# Patient Record
Sex: FEMALE | Race: BLACK OR AFRICAN AMERICAN | NOT HISPANIC OR LATINO | Employment: FULL TIME | URBAN - METROPOLITAN AREA
[De-identification: names, ages, dates, MRNs, and addresses within clinical notes are randomized per-mention and may not be internally consistent; named-entity substitution may affect disease eponyms.]

---

## 2025-04-14 ENCOUNTER — OFFICE VISIT (OUTPATIENT)
Dept: ORTHOPEDIC SURGERY | Facility: HOSPITAL | Age: 45
End: 2025-04-14
Payer: COMMERCIAL

## 2025-04-14 VITALS — HEIGHT: 66 IN | WEIGHT: 200 LBS | BODY MASS INDEX: 32.14 KG/M2

## 2025-04-14 DIAGNOSIS — S93.402A MODERATE ANKLE SPRAIN, LEFT, INITIAL ENCOUNTER: Primary | ICD-10-CM

## 2025-04-14 DIAGNOSIS — S92.152A AVULSION FRACTURE OF LEFT TALUS, CLOSED, INITIAL ENCOUNTER: ICD-10-CM

## 2025-04-14 PROCEDURE — 99214 OFFICE O/P EST MOD 30 MIN: CPT | Performed by: PHYSICIAN ASSISTANT

## 2025-04-14 PROCEDURE — 1036F TOBACCO NON-USER: CPT | Performed by: PHYSICIAN ASSISTANT

## 2025-04-14 PROCEDURE — L4361 PNEUMA/VAC WALK BOOT PRE OTS: HCPCS | Performed by: PHYSICIAN ASSISTANT

## 2025-04-14 PROCEDURE — 99204 OFFICE O/P NEW MOD 45 MIN: CPT | Performed by: PHYSICIAN ASSISTANT

## 2025-04-14 PROCEDURE — 3008F BODY MASS INDEX DOCD: CPT | Performed by: PHYSICIAN ASSISTANT

## 2025-04-14 ASSESSMENT — PAIN - FUNCTIONAL ASSESSMENT: PAIN_FUNCTIONAL_ASSESSMENT: 0-10

## 2025-04-14 ASSESSMENT — PAIN SCALES - GENERAL: PAINLEVEL_OUTOF10: 4

## 2025-04-14 NOTE — PROGRESS NOTES
History of Present Illness  45 y.o.female presents at same day walk in clinic for left ankle pain  1. Moderate ankle sprain, left, initial encounter  Walking Boot Tall    Referral to Physical Therapy      2. Avulsion fracture of left talus, closed, initial encounter  Walking Boot Tall    Referral to Physical Therapy        Mechanism of injury: ankle inversion on curb  Date of Injury/Pain: 4/12/25  Location of pain: lateral ankle  Frequency of Pain: worse with walking or standing  Associated symptoms? Swelling.  Previous treatment?  UC, xray, splint, NWB with crutches    Past Medical History:   Diagnosis Date    Encounter for immunization 12/14/2017    Need for influenza vaccination    Encounter for immunization 07/23/2013    Need for Td vaccine    Encounter for immunization 08/02/2013    Need for chickenpox vaccination    Other conditions influencing health status     Termination of pregnancy    Other conditions influencing health status     History of vaginal delivery    Personal history of other infectious and parasitic diseases     History of sexually transmitted disease    Personal history of other infectious and parasitic diseases 09/09/2014    History of trichomoniasis        No Known Allergies     No past surgical history on file.     No family history on file.     Social History     Socioeconomic History    Marital status: Single     Spouse name: Not on file    Number of children: Not on file    Years of education: Not on file    Highest education level: Not on file   Occupational History    Not on file   Tobacco Use    Smoking status: Never    Smokeless tobacco: Never   Vaping Use    Vaping status: Every Day    Substances: Nicotine, Flavoring    Devices: Disposable   Substance and Sexual Activity    Alcohol use: Not Currently    Drug use: Never    Sexual activity: Not on file   Other Topics Concern    Not on file   Social History Narrative    Not on file     Social Drivers of Health     Financial Resource  Strain: Not on File (2022)    Received from "[employer]    Financial Resource Strain     Financial Resource Strain: 0   Food Insecurity: Not on File (2024)    Received from "[employer]    Food Insecurity     Food: 0   Transportation Needs: Not on File (2022)    Received from "[employer]    Transportation Needs     Transportation: 0   Physical Activity: Not on File (2022)    Received from "[employer]    Physical Activity     Physical Activity: 0   Stress: Not on File (2022)    Received from "[employer]    Stress     Stress: 0   Social Connections: Not on File (2024)    Received from "[employer]    Social Connections     Connectedness: 0   Intimate Partner Violence: Not on file   Housing Stability: Not on File (2022)    Received from "[employer]    Housing Stability     Housin        CURRENT MEDICATIONS:   No current outpatient medications on file.     No current facility-administered medications for this visit.        REVIEW OF SYSTEMS  27 point review of systems negative except what is stated in HPI    Constitutional Exam: patient's height and weight reviewed, well-kempt  Psychiatric Exam: alert and oriented x 3, appropriate mood and behavior  Eye Exam: JUDY, EOMI  Pulmonary Exam: breathing non-labored, no apparent distress  Lymphatic exam: no appreciable lymphadenopathy in the lower extremities  Cardiovascular exam: DP pulses 2+ bilaterally, PT 2+ bilaterally, toes are pink with good capillary refill, no pitting edema  Skin exam: no open lesions, rashes, abrasions or ulcerations  Neurological exam: sensation to light touch intact in both lower extremities in peripheral and dermatomal distributions (except for any abnormalities noted in musculoskeletal exam)    PHYSICAL EXAM  On examination of the left ankle/foot:  NWB  Normal alignment  Minimal swelling of the ankle. No ecchymosis or erythema  Normal ROM in plantarflexion, dorsiflexion, inversion and eversion  Normal strength in plantarflexion, dorsiflexion, inversion  and eversion.  Tenderness to palpation: ATFL and lateral malleolus  No tenderness to palpation over the Achilles, medial malleolus, posterior tibial tendon, peroneal tendon, base of fifth metatarsal, deltoid ligament, talus or navicular.  Neurovascularly intact.  No sensory deficit to light touch.  Dorsalis pedis and posterior tibial pulses 2+ bilaterally.  Negative proprioception testing.   - Sebastian's test                              - Dorsiflexion-eversion test  1+ Anterior Drawer test                        - Talar tilt test  - Squeeze test     IMAGING  I personally reviewed the patient's x-ray images and reports of the left ankle. The xrays show no fractures or dislocation.  Normal joint spacing. Small ossicles adjacent to the dorsal talus        ASSESSMENT: left ankle sprain grade II    PLAN: I discussed with the patient the differential diagnosis, complex overuse and degenerative related nature of the condition(s) and available treatment option(s). Patient was placed in a tall CAM walker boot to be WBAT with crutches.  They were given boot instructions. The patient was given a prescription for physical therapy.  Physical therapy is medically necessary to improve strength, balance, range of motion and functional outcomes after injury and/or surgery. Patient was given a handout and instructed on an at home stretching program.  They should do these exercises 3 times per day for 6 weeks and then daily. Patient can use OTC aspercream for pain and continue to ice and elevate supported at the calf to reduce swelling. All the patient's questions were answered. The patient agrees with the above plan.  Follow up in 4-6 weeks or as needed    Patient was prescribed a CAM walker boot for ankle sprain.The patient is ambulatory with or without aid; but, has weakness, instability and/or deformity of their left ankle/foot which requires stabilization from this orthosis to improve their function.      Verbal and written  instructions for the use, wear schedule, cleaning and application of this item were given.  Patient was instructed that should the brace result in increased pain, decreased sensation, increased swelling, or an overall worsening of their medical condition, to please contact our office immediately.     Orthotic management and training was provided for skin care, modifications due to healing tissues, edema changes, interruption in skin integrity, and safety precautions with the orthosis.     Justice Flannery PA-C

## 2025-04-14 NOTE — PATIENT INSTRUCTIONS
YOUR BOOT INSTRUCTIONS:  You can start to put weight on your foot and ankle while in the boot in about 1 weeks; begin with 25% weight and increase by 25% every day if no pain.  Use crutches or walker for support as needed.   You should wear boot when walking or standing for 3 weeks.  You can take off your boot while bathing, sitting, stretching, icing or doing therapy exercises.  You can take your boot off at nighttime while sleeping after 1-2 weeks    BOOT WEANING:  DAY 1-- come out of boot for 1 hour (wear supportive athletic shoes and orthotic inserts), rest of the day in boot  DAY 2-- come out of boot for 2 hours (wear supportive athletic shoes and orthotic inserts), rest of the day in boot  DAY 3-- come out of boot for 3 hours (wear supportive athletic shoes and orthotic inserts), rest of the day in boot  DAY 4-- come out of boot for 4 hours (wear supportive athletic shoes and orthotic inserts), rest of the day in boot  DAY 5-- come out of boot and wear supportive shoes and orthotic inserts  * if you have pain while weaning out of boot then go back one day in the protocol (i.e. If really sore on the morning of Day 3 from coming out of boot for 2 hours the previous day, go back to Day 1 and start again through the protocol)    You can also use OTC aspercreme ointment and apply it to the injured area.      Ice and elevate supported at the calf to reduce swelling    You can use a bucket of room temperature water with Epson salt and do your ankle/toe exercises    1. Follow stretching exercises that were on a separate handout   2. Hold each stretch for a least 1 minute  3. Do not bounce while stretching  4. Stretch for 10 minutes at a time, 3x a day for 6 weeks then daily  5. Remember, it takes several weeks to a few months of consistent stretching to increase flexibility and decrease symptoms.     The patient was given a prescription for physical therapy.  Physical therapy is medically necessary to improve  strength, balance, range of motion and functional outcomes after injury and/or surgery.    Follow up in 6 weeks or as needed

## 2025-10-24 ENCOUNTER — APPOINTMENT (OUTPATIENT)
Dept: RADIOLOGY | Facility: CLINIC | Age: 45
End: 2025-10-24
Payer: COMMERCIAL